# Patient Record
Sex: MALE | Race: WHITE | NOT HISPANIC OR LATINO | Employment: FULL TIME | URBAN - METROPOLITAN AREA
[De-identification: names, ages, dates, MRNs, and addresses within clinical notes are randomized per-mention and may not be internally consistent; named-entity substitution may affect disease eponyms.]

---

## 2022-08-06 ENCOUNTER — HOSPITAL ENCOUNTER (EMERGENCY)
Facility: CLINIC | Age: 37
Discharge: HOME | End: 2022-08-06
Attending: EMERGENCY MEDICINE | Admitting: EMERGENCY MEDICINE
Payer: COMMERCIAL

## 2022-08-06 ENCOUNTER — APPOINTMENT (OUTPATIENT)
Dept: CT IMAGING | Facility: CLINIC | Age: 37
End: 2022-08-06
Attending: EMERGENCY MEDICINE
Payer: COMMERCIAL

## 2022-08-06 VITALS
HEART RATE: 72 BPM | SYSTOLIC BLOOD PRESSURE: 185 MMHG | TEMPERATURE: 97.8 F | RESPIRATION RATE: 18 BRPM | WEIGHT: 315 LBS | OXYGEN SATURATION: 99 % | DIASTOLIC BLOOD PRESSURE: 109 MMHG

## 2022-08-06 DIAGNOSIS — I10 BENIGN ESSENTIAL HYPERTENSION: ICD-10-CM

## 2022-08-06 LAB
ANION GAP SERPL CALCULATED.3IONS-SCNC: 8 MMOL/L (ref 5–18)
ATRIAL RATE - MUSE: 70 BPM
BASOPHILS # BLD AUTO: 0 10E3/UL (ref 0–0.2)
BASOPHILS NFR BLD AUTO: 1 %
BUN SERPL-MCNC: 13 MG/DL (ref 8–22)
CALCIUM SERPL-MCNC: 9 MG/DL (ref 8.5–10.5)
CHLORIDE BLD-SCNC: 104 MMOL/L (ref 98–107)
CO2 SERPL-SCNC: 28 MMOL/L (ref 22–31)
CREAT SERPL-MCNC: 0.8 MG/DL (ref 0.7–1.3)
DIASTOLIC BLOOD PRESSURE - MUSE: 114 MMHG
EOSINOPHIL # BLD AUTO: 0.1 10E3/UL (ref 0–0.7)
EOSINOPHIL NFR BLD AUTO: 1 %
ERYTHROCYTE [DISTWIDTH] IN BLOOD BY AUTOMATED COUNT: 13.3 % (ref 10–15)
GFR SERPL CREATININE-BSD FRML MDRD: >90 ML/MIN/1.73M2
GLUCOSE BLD-MCNC: 132 MG/DL (ref 70–125)
HCT VFR BLD AUTO: 38.1 % (ref 40–53)
HGB BLD-MCNC: 12.9 G/DL (ref 13.3–17.7)
IMM GRANULOCYTES # BLD: 0 10E3/UL
IMM GRANULOCYTES NFR BLD: 1 %
INTERPRETATION ECG - MUSE: NORMAL
LYMPHOCYTES # BLD AUTO: 1 10E3/UL (ref 0.8–5.3)
LYMPHOCYTES NFR BLD AUTO: 12 %
MCH RBC QN AUTO: 30.7 PG (ref 26.5–33)
MCHC RBC AUTO-ENTMCNC: 33.9 G/DL (ref 31.5–36.5)
MCV RBC AUTO: 91 FL (ref 78–100)
MONOCYTES # BLD AUTO: 0.2 10E3/UL (ref 0–1.3)
MONOCYTES NFR BLD AUTO: 3 %
NEUTROPHILS # BLD AUTO: 7.2 10E3/UL (ref 1.6–8.3)
NEUTROPHILS NFR BLD AUTO: 82 %
NRBC # BLD AUTO: 0 10E3/UL
NRBC BLD AUTO-RTO: 0 /100
P AXIS - MUSE: 15 DEGREES
PLATELET # BLD AUTO: 212 10E3/UL (ref 150–450)
POTASSIUM BLD-SCNC: 4.3 MMOL/L (ref 3.5–5)
PR INTERVAL - MUSE: 176 MS
QRS DURATION - MUSE: 88 MS
QT - MUSE: 422 MS
QTC - MUSE: 455 MS
R AXIS - MUSE: 46 DEGREES
RBC # BLD AUTO: 4.2 10E6/UL (ref 4.4–5.9)
SODIUM SERPL-SCNC: 140 MMOL/L (ref 136–145)
SYSTOLIC BLOOD PRESSURE - MUSE: 203 MMHG
T AXIS - MUSE: 39 DEGREES
VENTRICULAR RATE- MUSE: 70 BPM
WBC # BLD AUTO: 8.6 10E3/UL (ref 4–11)

## 2022-08-06 PROCEDURE — 99285 EMERGENCY DEPT VISIT HI MDM: CPT | Mod: 25

## 2022-08-06 PROCEDURE — 250N000011 HC RX IP 250 OP 636: Performed by: EMERGENCY MEDICINE

## 2022-08-06 PROCEDURE — 93005 ELECTROCARDIOGRAM TRACING: CPT | Performed by: EMERGENCY MEDICINE

## 2022-08-06 PROCEDURE — 250N000013 HC RX MED GY IP 250 OP 250 PS 637: Performed by: EMERGENCY MEDICINE

## 2022-08-06 PROCEDURE — 70450 CT HEAD/BRAIN W/O DYE: CPT

## 2022-08-06 PROCEDURE — 36415 COLL VENOUS BLD VENIPUNCTURE: CPT | Performed by: EMERGENCY MEDICINE

## 2022-08-06 PROCEDURE — 85025 COMPLETE CBC W/AUTO DIFF WBC: CPT | Performed by: EMERGENCY MEDICINE

## 2022-08-06 PROCEDURE — 82310 ASSAY OF CALCIUM: CPT | Performed by: EMERGENCY MEDICINE

## 2022-08-06 PROCEDURE — 96375 TX/PRO/DX INJ NEW DRUG ADDON: CPT

## 2022-08-06 PROCEDURE — 96374 THER/PROPH/DIAG INJ IV PUSH: CPT

## 2022-08-06 RX ORDER — LISINOPRIL 10 MG/1
20 TABLET ORAL ONCE
Status: COMPLETED | OUTPATIENT
Start: 2022-08-06 | End: 2022-08-06

## 2022-08-06 RX ORDER — METOCLOPRAMIDE HYDROCHLORIDE 5 MG/ML
10 INJECTION INTRAMUSCULAR; INTRAVENOUS ONCE
Status: COMPLETED | OUTPATIENT
Start: 2022-08-06 | End: 2022-08-06

## 2022-08-06 RX ORDER — DIPHENHYDRAMINE HYDROCHLORIDE 50 MG/ML
25 INJECTION INTRAMUSCULAR; INTRAVENOUS ONCE
Status: COMPLETED | OUTPATIENT
Start: 2022-08-06 | End: 2022-08-06

## 2022-08-06 RX ORDER — LISINOPRIL 20 MG/1
20 TABLET ORAL DAILY
Qty: 30 TABLET | Refills: 0 | Status: SHIPPED | OUTPATIENT
Start: 2022-08-06

## 2022-08-06 RX ORDER — KETOROLAC TROMETHAMINE 15 MG/ML
15 INJECTION, SOLUTION INTRAMUSCULAR; INTRAVENOUS ONCE
Status: COMPLETED | OUTPATIENT
Start: 2022-08-06 | End: 2022-08-06

## 2022-08-06 RX ADMIN — LISINOPRIL 20 MG: 10 TABLET ORAL at 16:34

## 2022-08-06 RX ADMIN — METOCLOPRAMIDE HYDROCHLORIDE 10 MG: 5 INJECTION INTRAMUSCULAR; INTRAVENOUS at 16:38

## 2022-08-06 RX ADMIN — KETOROLAC TROMETHAMINE 15 MG: 15 INJECTION, SOLUTION INTRAMUSCULAR; INTRAVENOUS at 16:35

## 2022-08-06 RX ADMIN — DIPHENHYDRAMINE HYDROCHLORIDE 25 MG: 50 INJECTION, SOLUTION INTRAMUSCULAR; INTRAVENOUS at 16:36

## 2022-08-06 ASSESSMENT — ENCOUNTER SYMPTOMS
HEADACHES: 1
NAUSEA: 1
WEAKNESS: 0
CONSTIPATION: 0
VOMITING: 1
FREQUENCY: 0
SHORTNESS OF BREATH: 0
DIFFICULTY URINATING: 0
BLOOD IN STOOL: 0
PHOTOPHOBIA: 1
CHILLS: 0
DYSURIA: 0
DIARRHEA: 0
FATIGUE: 0
HEMATURIA: 0
FEVER: 0
COUGH: 0
ABDOMINAL PAIN: 0

## 2022-08-06 NOTE — ED TRIAGE NOTES
"Pt presents with c/o headache, HTN and nausea. Pt reports symptoms starting in last 24 hrs. Pt reports, \"he has not taken lisinopril in one week\". ABCs intact.       Triage Assessment     Row Name 08/06/22 1558       Triage Assessment (Adult)    Airway WDL WDL       Respiratory WDL    Respiratory WDL WDL       Skin Circulation/Temperature WDL    Skin Circulation/Temperature WDL WDL       Cardiac WDL    Cardiac WDL WDL       Peripheral/Neurovascular WDL    Peripheral Neurovascular WDL WDL       Cognitive/Neuro/Behavioral WDL    Cognitive/Neuro/Behavioral WDL WDL              "

## 2022-08-06 NOTE — ED PROVIDER NOTES
EMERGENCY DEPARTMENT ENCOUNTER      NAME: Navneet Saenz  AGE: 36 year old male  YOB: 1985  MRN: [id number]  EVALUATION DATE & TIME: 8/6/2022  3:53 PM    PCP: No primary care provider on file.    ED PROVIDER: Su Underwood DO      Chief Complaint   Patient presents with     Headache     Nausea     Hypertension         FINAL IMPRESSION:  1. Benign essential hypertension          ED COURSE & MEDICAL DECISION MAKING:    Pertinent Labs & Imaging studies reviewed. (See chart for details)    4:20 PM I met the patient and performed my initial interview and exam.  6:14 PM Rechecked and updated patient.   7:02 PM Rechecked and updated patient. Repeated neuro exam which was normal/unremarkable. I discussed plans for discharge with the patient, which they were agreeable to. We discussed supportive cares at home and reasons for return to the ER including new or worsening symptoms. All questions and concerns were addressed. Patient to be discharged by RN.     36 year old male presents to the Emergency Department for evaluation of headache, elevated blood pressure.  Reports he was awoken in the middle night with sudden onset of headache.  Located top of his head.  Associated vomiting.  He has had these headaches in the past related to his blood pressure being high.  Patient is on lisinopril but has been out of it for a week as he is up here working.  Denies any other past medical history.  No trauma.  His neurologic exam is nonfocal.  No fevers.  No chest pain.  EKG without ischemic findings.  Plan for basic labs to rule out endorgan damage, head CT, headache control and home lisinopril dose.  Labs unremarkable.  Blood pressure did downtrend.  He denies any pain after Toradol, Reglan and Benadryl.  Head CT was obtained.  Showed a small area of nonspecific hypoattenuation involving the left prefrontal cortex along the anterior skull base, presumably artifact.  However a subtle lesion or parenchymal insult could  not be completely excluded.  Could consider follow-up MRI.  Partially empty sella turcica, likely incidental.  Again could follow-up with an MRV.  Discussed with patient.  He elected to get the imaging done here today, unfortunately patient does not fit in our MRI scanner.  Repeat exam again benign without any focal neurologic deficits and headache is resolved.  Given his reassuring exam, I feel that this imaging can be done as an outpatient.  He agrees.  Encourage close follow-up with his prime provider.  Encourage return if any acute worsening of symptoms, neurologic symptoms, persistent headache or any other concerns.    At the conclusion of the encounter I discussed the results of all of the tests and the disposition. The questions were answered. The patient or family acknowledged understanding and was agreeable with the care plan.       MEDICATIONS GIVEN IN THE EMERGENCY:  Medications   lisinopril (ZESTRIL) tablet 20 mg (20 mg Oral Given 8/6/22 1634)   metoclopramide (REGLAN) injection 10 mg (10 mg Intravenous Given 8/6/22 1638)   diphenhydrAMINE (BENADRYL) injection 25 mg (25 mg Intravenous Given 8/6/22 1636)   ketorolac (TORADOL) injection 15 mg (15 mg Intravenous Given 8/6/22 1635)       NEW PRESCRIPTIONS STARTED AT TODAY'S ER VISIT  New Prescriptions    LISINOPRIL (ZESTRIL) 20 MG TABLET    Take 1 tablet (20 mg) by mouth daily          =================================================================    HPI    Patient information was obtained from: Patient    Use of : N/A       Navneet Saenz is a 36 year old male with a pertinent history on file who presents to this ED via walk-in for evaluation of a headache and vomiting.    Patient reports that last night while sleeping he developed a headache at the top of his scalp. He endorses associated nausea and approximately 4 episodes of non-bloody emesis since last night. He additionally endorses associated mild photophobia. He endorses a PMH of HTN  and notes that he has not taken has Lisinopril for approximately one week. He endorses having headaches with associated nausea and vomiting in the past secondary to not taking his Lisinopril, but he mentions that he has never went this long without taking his blood pressure medications. He endorses having a persistent headache within the ED that rates a 10/10 in intensity. He notes that he is originally from [place] and states that he needs to be in [place] for approximately 3 more weeks due to work. He denies any other known pettinent medical history and states that the only medication he takes is Lisinopril. He endorses occasional alcohol and marijuana use. He denies any recent visual disturbance, chest pain, shortness of breath, abdominal pain, fever, chills, urinary issues, bowel issues, stool issues, or any other complications at this time.       REVIEW OF SYSTEMS   Review of Systems   Constitutional: Negative for chills, fatigue and fever.   Eyes: Positive for photophobia (mild). Negative for visual disturbance.   Respiratory: Negative for cough and shortness of breath.    Cardiovascular: Negative for chest pain.   Gastrointestinal: Positive for nausea and vomiting (non-bloody). Negative for abdominal pain, blood in stool, constipation and diarrhea.   Genitourinary: Negative for difficulty urinating, dysuria, frequency, hematuria and urgency.   Skin: Negative.    Neurological: Positive for headaches (top of scalp). Negative for syncope and weakness.   All other systems reviewed and are negative.      PAST MEDICAL HISTORY:  History reviewed. No pertinent past medical history.    PAST SURGICAL HISTORY:  No past surgical history on file.        CURRENT MEDICATIONS:    lisinopril (ZESTRIL) 20 MG tablet         ALLERGIES:  Allergies   Allergen Reactions     Penicillins Hives       FAMILY HISTORY:  No family history on file.    SOCIAL HISTORY:        VITALS:  BP (!) 154/95   Pulse 72   Temp 97.8  F (36.6  C)  (Oral)   Resp 9   Wt (!) 156.5 kg (345 lb)   SpO2 98%     PHYSICAL EXAM    Physical Exam  Constitutional:       General: He is not in acute distress.  HENT:      Head: Normocephalic and atraumatic.      Mouth/Throat:      Pharynx: Oropharynx is clear.   Eyes:      Pupils: Pupils are equal, round, and reactive to light.   Cardiovascular:      Rate and Rhythm: Normal rate and regular rhythm.      Pulses: Normal pulses.      Heart sounds: Normal heart sounds.   Pulmonary:      Effort: Pulmonary effort is normal.      Breath sounds: Normal breath sounds.   Abdominal:      General: Abdomen is flat. Bowel sounds are normal.      Palpations: Abdomen is soft.      Tenderness: There is no abdominal tenderness.   Musculoskeletal:         General: Normal range of motion.      Cervical back: No rigidity.   Skin:     General: Skin is warm and dry.      Capillary Refill: Capillary refill takes less than 2 seconds.   Neurological:      General: No focal deficit present.      Mental Status: He is alert and oriented to person, place, and time.      Cranial Nerves: Cranial nerves 2-12 are intact.      Sensory: Sensation is intact. No sensory deficit.      Motor: Motor function is intact.          LAB:  All pertinent labs reviewed and interpreted.  Labs Ordered and Resulted from Time of ED Arrival to Time of ED Departure   BASIC METABOLIC PANEL - Abnormal       Result Value    Sodium 140      Potassium 4.3      Chloride 104      Carbon Dioxide (CO2) 28      Anion Gap 8      Urea Nitrogen 13      Creatinine 0.80      Calcium 9.0      Glucose 132 (*)     GFR Estimate >90     CBC WITH PLATELETS AND DIFFERENTIAL - Abnormal    WBC Count 8.6      RBC Count 4.20 (*)     Hemoglobin 12.9 (*)     Hematocrit 38.1 (*)     MCV 91      MCH 30.7      MCHC 33.9      RDW 13.3      Platelet Count 212      % Neutrophils 82      % Lymphocytes 12      % Monocytes 3      % Eosinophils 1      % Basophils 1      % Immature Granulocytes 1      NRBCs per  100 WBC 0      Absolute Neutrophils 7.2      Absolute Lymphocytes 1.0      Absolute Monocytes 0.2      Absolute Eosinophils 0.1      Absolute Basophils 0.0      Absolute Immature Granulocytes 0.0      Absolute NRBCs 0.0         RADIOLOGY:  Reviewed all pertinent imaging. Please see official radiology report.  Head CT w/o contrast   Final Result   IMPRESSION:   1.  Small area of nonspecific hypoattenuation involving the left prefrontal cortex along the anterior skull base, presumably artifactual (not an uncommon area for artifact). However, subtle lesion or parenchymal insult cannot be completely excluded. MRI    or follow-up head CT could be performed if indicated.   2.  Partially empty sella turcica, presumably incidental. If MRI is pursued, MRV could be added to evaluate for transverse sinus narrowing if indicated.   3.  Otherwise, no acute intracranial abnormality.      MR Brain w/o & w Contrast    (Results Pending)   MRV Brain with Contrast    (Results Pending)       EKG:    Performed at: 4:01 PM, 06-AUG-2022    Impression: Sinus rhythm. Normal ECG.    Rate: 70 BPM  Rhythm: Sinus rhythm.  P-R-T Axes: 15 46 39  WI Interval: 176 ms  QRS Interval: 88 ms  QTc Interval: 455 ms  Comparison: No previous ECGs available for comparison.     I have independently reviewed and interpreted the EKG(s) documented above.        I, Kishan Zimmerman, am serving as a scribe to document services personally performed by Dr. Su Underwood based on my observation and the provider's statements to me. ISu, DO attest that Kishan Zimmerman is acting in a scribe capacity, has observed my performance of the services and has documented them in accordance with my direction.    Su Underwood DO  Emergency Medicine  [facility] EMERGENCY ROOM  [address] [facility] [id number] [phone]  Dept: [phone]     Su Underwood DO  08/06/22 1931

## 2022-08-07 NOTE — DISCHARGE INSTRUCTIONS
Your lisinopril has been refilled.  Please follow up with your doctor for further refills  You may continue Tylenol or ibuprofen if any recurrent headaches  As discussed, your head CT showed likely artifact to the left side of your brain, could get an MRI as an outpatient.  I would recommend you get repeat imaging when you follow-up with your doctor, I do not think it is necessary now given resolution of your headache and no neurologic symptoms  If things were to change such as acute onset of headache, blurred vision, difficulty speaking or walking or moving her extremities, I recommend that you are seen again